# Patient Record
Sex: MALE | Race: WHITE | NOT HISPANIC OR LATINO | Employment: OTHER | URBAN - METROPOLITAN AREA
[De-identification: names, ages, dates, MRNs, and addresses within clinical notes are randomized per-mention and may not be internally consistent; named-entity substitution may affect disease eponyms.]

---

## 2017-01-09 ENCOUNTER — ALLSCRIPTS OFFICE VISIT (OUTPATIENT)
Dept: OTHER | Facility: OTHER | Age: 77
End: 2017-01-09

## 2017-03-01 DIAGNOSIS — I26.99 OTHER PULMONARY EMBOLISM WITHOUT ACUTE COR PULMONALE (HCC): ICD-10-CM

## 2017-03-01 DIAGNOSIS — I82.90 ACUTE EMBOLISM AND THROMBOSIS OF VEIN: ICD-10-CM

## 2017-03-02 ENCOUNTER — HOSPITAL ENCOUNTER (OUTPATIENT)
Dept: NON INVASIVE DIAGNOSTICS | Facility: CLINIC | Age: 77
Discharge: HOME | End: 2017-03-02
Payer: COMMERCIAL

## 2017-03-02 DIAGNOSIS — I26.99 OTHER PULMONARY EMBOLISM WITHOUT ACUTE COR PULMONALE (HCC): ICD-10-CM

## 2017-03-02 DIAGNOSIS — I82.90 ACUTE EMBOLISM AND THROMBOSIS OF VEIN: ICD-10-CM

## 2017-03-02 PROCEDURE — 93970 EXTREMITY STUDY: CPT

## 2017-03-21 ENCOUNTER — GENERIC CONVERSION - ENCOUNTER (OUTPATIENT)
Dept: OTHER | Facility: OTHER | Age: 77
End: 2017-03-21

## 2017-03-22 ENCOUNTER — GENERIC CONVERSION - ENCOUNTER (OUTPATIENT)
Dept: OTHER | Facility: OTHER | Age: 77
End: 2017-03-22

## 2017-04-18 ENCOUNTER — ALLSCRIPTS OFFICE VISIT (OUTPATIENT)
Dept: OTHER | Facility: OTHER | Age: 77
End: 2017-04-18

## 2018-01-12 NOTE — MISCELLANEOUS
Message  yoel from [facility] called, they have no pre op inst for pt for ivc filter tomorrow  she said someone from hosp just called and said pt to be npo after midnight  she is on lovenox  per Dr Lee Galdamez should be held tomorrow, can take all other meds w/ small sips of water  called back s/w yolette, nurse not avail she will have her return my call  [address] from [facility] returned call, informed of above  she verb understanding of same  1        1 Amended By: Sunita Wallace; Mar 21 2017 3:58 PM EST    Active Problems   1  Acute thromboembolism of deep veins of calf, bilateral (453 42) (I82 4Z3)  2  Dementia (294 20) (F03 90)  3  Dysphagia (787 20) (R13 10)  4  Essential hypertension (401 9) (I10)  5  Glaucoma (365 9) (H40 9)  6  Insomnia (780 52) (G47 00)  7  Orthostatic hypotension (458 0) (I95 1)  8  Other pulmonary embolism without acute cor pulmonale, unspecified chronicity (415 19)   (I26 99)  9  Parkinson's disease (332 0) (G20)  10  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) (Z95 828)  11  Syncope and collapse (780 2) (R55)  12  Thrombophlebitis of left saphenous vein (451 0) (I80 02)  13  Vitamin D deficiency, unspecified (268 9) (E55 9)    Current Meds  1  Acetaminophen 325 MG Oral Tablet; Therapy: (Recorded:2017) to Recorded  2  Adult Aspirin EC Low Strength 81 MG Oral Tablet Delayed Release; TAKE 1 TABLET   DAILY; Therapy: [id number] to Recorded  3  Bengay Ultra Strength 5 % External Pad; Therapy: (YZSVINXL:[id number]) to Recorded  4  Bisacodyl 10 MG Rectal Suppository; Therapy: (ISRERJTI:88EYE6271) to Recorded  5  Calcium TABS; Therapy: (AYNILRIN:90HQA4986) to Recorded  6  Cholecalciferol 1000 UNIT TABS; Therapy: (EDSFRIDZ:59ZNY8854) to Recorded  7  Colace 100 MG Oral Capsule; TAKE 1 CAPSULE TWICE DAILY AS NEEDED; Therapy: [id number] to Recorded  8  Enoxaparin Sodium 40 MG/0 4ML Subcutaneous Solution; Therapy: ([id number]) to Recorded  9   Fludrocortisone Acetate 0 1 MG Oral Tablet; Take 1 tablet daily; Therapy: [id number] to (Trinna Scheuermann) Recorded  10  Fludrocortisone Acetate TABS; Therapy: ([id number]) to Recorded  11  Latanoprost 0 005 % Ophthalmic Solution; INSTILL 1 DROP IN BOTH EYES AT    BEDTIME; Therapy: [id number] to Recorded  12  Latanoprost 0 005 % Ophthalmic Solution; Therapy: ([id number]) to Recorded  13  Melatonin 3 MG Oral Tablet; TAKE 1 TABLET Bedtime; Therapy: [id number] to Recorded  14  Midodrine HCl - 10 MG Oral Tablet; TAKE 1 TABLET 3 TIMES DAILY; Therapy: [id number] to Recorded  15  Midodrine HCl - 10 MG Oral Tablet; Therapy: (VADKLHHZ:91VUN1903) to Recorded  16  Milk of Magnesia SUSP; Therapy: ([id number]) to Recorded  17  Resource 2 0 LIQD;    Therapy: (FFMTWJII:26NBD6796) to Recorded  18  Vitamin D 2000 UNIT Oral Tablet; Take 1 tablet daily; Therapy: 07ISC1618 to Recorded    Allergies   1   No Known Drug Allergies    Signatures   Electronically signed by : Lory Thomas, ; Mar 21 2017  4:00PM EST                       (Author)

## 2018-01-13 VITALS
BODY MASS INDEX: 22.71 KG/M2 | SYSTOLIC BLOOD PRESSURE: 140 MMHG | RESPIRATION RATE: 14 BRPM | HEIGHT: 64 IN | WEIGHT: 133 LBS | DIASTOLIC BLOOD PRESSURE: 78 MMHG | HEART RATE: 60 BPM

## 2018-01-14 VITALS
HEIGHT: 64 IN | WEIGHT: 134 LBS | SYSTOLIC BLOOD PRESSURE: 158 MMHG | BODY MASS INDEX: 22.88 KG/M2 | DIASTOLIC BLOOD PRESSURE: 90 MMHG

## 2018-04-17 ENCOUNTER — OFFICE VISIT (OUTPATIENT)
Dept: NEPHROLOGY | Facility: CLINIC | Age: 78
End: 2018-04-17
Payer: COMMERCIAL

## 2018-04-17 VITALS — DIASTOLIC BLOOD PRESSURE: 78 MMHG | SYSTOLIC BLOOD PRESSURE: 138 MMHG

## 2018-04-17 DIAGNOSIS — I95.1 ORTHOSTATIC HYPOTENSION: ICD-10-CM

## 2018-04-17 DIAGNOSIS — R26.2 AMBULATORY DYSFUNCTION: Primary | ICD-10-CM

## 2018-04-17 PROCEDURE — 99213 OFFICE O/P EST LOW 20 MIN: CPT | Performed by: INTERNAL MEDICINE

## 2018-04-17 NOTE — PROGRESS NOTES
NEPHROLOGY OFFICE VISIT   Zoey Avila 77 y o  male MRN: [id number]  4/17/2018    Reason for Visit:  Orthostatic hypotension    ASSESSMENT and PLAN:    I had the pleasure of seeing Henry Mera today in the renal clinic for the continued management of orthostatic hypotension  Since our last visit, there has been no ER visits or hospitilizations  He currently has no complaints at this time and is feeling well  Patient denies any chest pain, shortness of breath and swelling  The last blood work was done on April 2018, which we have reviewed together  Blood pressure log reviewed  There were all pretty much sitting blood pressures and they all seem to be hitting his goal   No episodes of hypotension that was seen  He reports no dizziness or lightheadedness and no falls  1 ) Orthostatic hypotension  · Likely related to autonomic dysfunction  · Aim to keep systolic blood pressure between 130-160, to allow not Residual to Prevent Falling  · Seems to currently be controlled on current medications including fludrocortisone and Midodrine  · Discussed maneuvers to help avoid orthostatics  · Avoid hypertensive medications    No further follow-up required      PATIENT INSTRUCTIONS:    Patient Instructions   1 ) Continue current medications    2 ) Monitor blood pressure at home        No orders of the defined types were placed in this encounter  OBJECTIVE:  Current Weight:    There were no vitals filed for this visit  There is no height or weight on file to calculate BMI  REVIEW OF SYSTEMS:    Review of Systems   Constitutional: Negative for activity change and fever  Respiratory: Negative for cough, chest tightness, shortness of breath and wheezing  Cardiovascular: Negative for chest pain and leg swelling  Gastrointestinal: Negative for abdominal pain, diarrhea, nausea and vomiting  Endocrine: Negative for polyuria  Genitourinary: Negative for difficulty urinating, dysuria, flank pain, frequency and urgency  Musculoskeletal:        Right arm weakness right arm weakness   Skin: Negative for rash  Neurological: Negative for dizziness, syncope, light-headedness and headaches  PHYSICAL EXAM:      Physical Exam   Constitutional: He is oriented to person, place, and time  He appears well-developed and well-nourished  No distress  HENT:   Head: Normocephalic and atraumatic  Eyes: Pupils are equal, round, and reactive to light  No scleral icterus  Neck: Normal range of motion  Neck supple  Cardiovascular: Normal rate, regular rhythm and normal heart sounds  Exam reveals no gallop and no friction rub  No murmur heard  Pulmonary/Chest: Effort normal and breath sounds normal  No respiratory distress  He has no wheezes  He has no rales  He exhibits no tenderness  Abdominal: Soft  Bowel sounds are normal  He exhibits no distension  There is no tenderness  There is no rebound  Musculoskeletal: Normal range of motion  He exhibits no edema  Neurological: He is alert and oriented to person, place, and time  Skin: No rash noted  He is not diaphoretic  Psychiatric: He has a normal mood and affect  Nursing note and vitals reviewed  Medications:    Current Outpatient Prescriptions:     acetaminophen (TYLENOL) 325 mg tablet, Take 650 mg by mouth every 6 (six) hours as needed for mild pain, Disp: , Rfl:     aspirin 81 MG tablet, Take 81 mg by mouth daily, Disp: , Rfl:     docusate sodium (COLACE) 100 mg capsule, Take 100 mg by mouth daily as needed for constipation  , Disp: , Rfl:     fludrocortisone (FLORINEF) 0 1 mg tablet, Take 0 5 mg by mouth daily, Disp: , Rfl:     latanoprost (XALATAN) 0 005 % ophthalmic solution, Administer 1 drop to both eyes daily at bedtime, Disp: , Rfl:     magnesium hydroxide (MILK OF MAGNESIA) 400 mg/5 mL oral suspension, Take 30 mL by mouth daily as needed for constipation, Disp: , Rfl:     Melatonin 5 MG TABS, Take 3 mg by mouth , Disp: , Rfl:     midodrine (PROAMATINE) 10 MG tablet, Take 10 mg by mouth 3 (three) times a day, Disp: , Rfl:     Vitamin D, Cholecalciferol, 1000 UNITS CAPS, Take 2 tablets by mouth daily  , Disp: , Rfl:     Laboratory Results:        Results for orders placed or performed during the hospital encounter of 10/31/16   CBC and differential   Result Value Ref Range    WBC 4 33 4 31 - 10 16 Thousand/uL    RBC 4 46 3 88 - 5 62 Million/uL    Hemoglobin 13 0 12 0 - 17 0 g/dL    Hematocrit 40 5 36 5 - 49 3 %    MCV 91 82 - 98 fL    MCH 29 1 26 8 - 34 3 pg    MCHC 32 1 31 4 - 37 4 g/dL    RDW 14 7 11 6 - 15 1 %    MPV 9 9 8 9 - 12 7 fL    Platelets 502 747 - 182 Thousands/uL    Neutrophils Relative 62 43 - 75 %    Lymphocytes Relative 29 14 - 44 %    Monocytes Relative 6 4 - 12 %    Eosinophils Relative 2 0 - 6 %    Basophils Relative 1 0 - 1 %    Neutrophils Absolute 2 71 1 85 - 7 62 Thousands/µL    Lymphocytes Absolute 1 26 0 60 - 4 47 Thousands/µL    Monocytes Absolute 0 25 0 17 - 1 22 Thousand/µL    Eosinophils Absolute 0 08 0 00 - 0 61 Thousand/µL    Basophils Absolute 0 03 0 00 - 0 10 Thousands/µL   Comprehensive metabolic panel   Result Value Ref Range    Sodium 148 (H) 136 - 145 mmol/L    Potassium 3 9 3 5 - 5 3 mmol/L    Chloride 108 100 - 108 mmol/L    CO2 33 (H) 21 - 32 mmol/L    Anion Gap 7 4 - 13 mmol/L    BUN 18 5 - 25 mg/dL    Creatinine 1 23 0 60 - 1 30 mg/dL    Glucose 102 65 - 140 mg/dL    Calcium 9 1 8 3 - 10 1 mg/dL    AST 16 5 - 45 U/L    ALT 15 12 - 78 U/L    Alkaline Phosphatase 62 46 - 116 U/L    Total Protein 7 4 6 4 - 8 2 g/dL    Albumin 3 7 3 5 - 5 0 g/dL    Total Bilirubin 0 90 0 20 - 1 00 mg/dL    eGFR 57 1 ml/min/1 73sq m   Lipase   Result Value Ref Range    Lipase 83 73 - 393 u/L   Troponin I   Result Value Ref Range    Troponin I <0 02 <0 04 ng/mL   POCT urinalysis dipstick   Result Value Ref Range    Color, UA yellow     Clarity, UA clear     EXT Glucose, UA (Ref: Negative) negative     EXT Bilirubin, UA (Ref: Negative) negaive     EXT Ketones, UA (Ref: Negative) negative     EXT Spec Grav, UA 1 010     EXT pH, UA 7 0     EXT Protein, UA (Ref: Negative) trace     EXT Urobilinogen, UA (Ref: 0 2- 1 0) 0 2     EXT Nitrite, UA (Ref: Negative) negative     EXT Leukocytes, UA (Ref: Negative) negative     EXT Blood, UA (Ref: Negative) negative    ECG 12 lead   Result Value Ref Range    Ventricular Rate 70 BPM    Atrial Rate 72 BPM    VT Interval 172 ms    QRSD Interval 80 ms    QT Interval 380 ms    QTC Interval 410 ms    P Axis 60 degrees    QRS Axis -23 degrees    T Wave Axis 21 degrees